# Patient Record
Sex: MALE | Race: WHITE | NOT HISPANIC OR LATINO | Employment: FULL TIME | RURAL
[De-identification: names, ages, dates, MRNs, and addresses within clinical notes are randomized per-mention and may not be internally consistent; named-entity substitution may affect disease eponyms.]

---

## 2023-03-17 ENCOUNTER — OFFICE VISIT (OUTPATIENT)
Dept: FAMILY MEDICINE CLINIC | Facility: CLINIC | Age: 23
End: 2023-03-17
Payer: COMMERCIAL

## 2023-03-17 VITALS
OXYGEN SATURATION: 96 % | TEMPERATURE: 98.1 F | WEIGHT: 241 LBS | BODY MASS INDEX: 35.7 KG/M2 | RESPIRATION RATE: 16 BRPM | DIASTOLIC BLOOD PRESSURE: 89 MMHG | SYSTOLIC BLOOD PRESSURE: 135 MMHG | HEIGHT: 69 IN | HEART RATE: 89 BPM

## 2023-03-17 DIAGNOSIS — R79.89 ABNORMAL CBC: ICD-10-CM

## 2023-03-17 DIAGNOSIS — R79.89 LOW TESTOSTERONE: ICD-10-CM

## 2023-03-17 DIAGNOSIS — Z11.59 NEED FOR HEPATITIS C SCREENING TEST: ICD-10-CM

## 2023-03-17 DIAGNOSIS — E29.1 HYPOGONADISM IN MALE: ICD-10-CM

## 2023-03-17 DIAGNOSIS — R03.0 ELEVATED BLOOD PRESSURE READING: ICD-10-CM

## 2023-03-17 DIAGNOSIS — Z00.00 WELLNESS EXAMINATION: Primary | ICD-10-CM

## 2023-03-17 DIAGNOSIS — F43.9 STRESS: ICD-10-CM

## 2023-03-17 DIAGNOSIS — E66.01 CLASS 2 SEVERE OBESITY DUE TO EXCESS CALORIES WITH SERIOUS COMORBIDITY AND BODY MASS INDEX (BMI) OF 35.0 TO 35.9 IN ADULT: ICD-10-CM

## 2023-03-17 PROBLEM — K76.0 FATTY LIVER: Status: ACTIVE | Noted: 2023-03-17

## 2023-03-17 PROBLEM — I88.0 MESENTERIC ADENITIS: Status: ACTIVE | Noted: 2023-03-17

## 2023-03-17 PROBLEM — E66.3 OVERWEIGHT: Status: RESOLVED | Noted: 2023-03-17 | Resolved: 2023-03-17

## 2023-03-17 PROBLEM — E66.812 CLASS 2 SEVERE OBESITY DUE TO EXCESS CALORIES WITH SERIOUS COMORBIDITY AND BODY MASS INDEX (BMI) OF 35.0 TO 35.9 IN ADULT: Status: ACTIVE | Noted: 2023-03-17

## 2023-03-17 PROBLEM — E66.3 OVERWEIGHT: Status: ACTIVE | Noted: 2023-03-17

## 2023-03-17 PROCEDURE — 99385 PREV VISIT NEW AGE 18-39: CPT | Performed by: NURSE PRACTITIONER

## 2023-03-17 NOTE — PROGRESS NOTES
Chief Complaint  Annual Exam and Establish Care    Subjective          Neel is a 22 y.o. male  who presents to [facility]     Patient Care Team:  Angélica Abraham APRN as PCP - General (Family Medicine)     History of Present Illness  Neel is here to establish care.  New patient.    Adult Annual Wellness    Social History    Tobacco Use      Smoking status: Never      Smokeless tobacco: Never    Vaping Use      Vaping Use: Some days        Substances: Nicotine, Flavoring        Devices: Refillable tank    Alcohol use: Yes      Comment: Social    Drug use: Never    General health habits  Last eye exam -  Over 1 year ago  Last dental exam - over 1 year ago    Diet - Regular diet    Weight / BMI - obese,  BMI 35.6    Exercise - none    Reproductive health -  Sexually active - yes  Trying to conceive    Screening/prevention  Colon cancer screening - not applicable due to age    He goes to the [facility]  He thinks he has been going for 14 months.  He made the appointment due to decreased sex drive and difficulty getting and maintaining erections.  First visit he had labwork.  He was then started on testosterone shots.  He goes once weekly for a testosterone injection (unsure dose).   Last injection 3/7/23  Last labwork was ~ 5 weeks ago.  He feels the shots helped in the beginning (more energy) but not now.    He has never fathered a child.  He has had intercourse with his partner for over 1 year without protection and she has not become pregnant.    He was told his hemoglobin was elevated on 2 different occasions and was told to donate blood.    Left eye peripheral vision (blurry - vision goes dark) for about 20 minutes. This has happened twice  Vision returned and then he had a headache.  Headache lasted about 1hour  He took excedrin migraine which helped.    Feeling anxious and depressed  Job is stressful  Inability to perform sexually is causing stress and  depression.      Neel Spear  has a past medical history of Depression, Headache, and Visual impairment.      Review of Systems   Constitutional: Positive for fatigue. Negative for fever.   HENT: Negative for ear pain and sore throat.    Eyes: Positive for visual disturbance (two times).   Respiratory: Negative for cough and shortness of breath.    Cardiovascular: Positive for palpitations. Negative for chest pain.   Gastrointestinal: Negative for abdominal pain, blood in stool, constipation, diarrhea, nausea and vomiting.   Endocrine: Positive for polydipsia. Negative for polyphagia and polyuria.   Genitourinary: Negative for difficulty urinating, frequency and urgency.   Musculoskeletal: Negative for arthralgias.   Skin: Negative for rash.   Allergic/Immunologic: Positive for environmental allergies.   Neurological: Positive for headaches. Negative for dizziness.   Psychiatric/Behavioral: Positive for dysphoric mood. Negative for suicidal ideas. The patient is nervous/anxious.         Family History   Problem Relation Age of Onset   • Depression Mother    • Bipolar disorder Mother    • Anxiety disorder Mother    • Hypothyroidism Mother    • Heart attack Mother    • Nephrolithiasis Father    • Drug abuse Father         Former   • Scoliosis Sister    • ADD / ADHD Sister    • Anxiety disorder Sister    • Depression Sister    • Neuropathy Maternal Grandmother    • Hypothyroidism Maternal Grandmother    • Dementia Maternal Grandmother    • Heart attack Maternal Grandfather         Past Surgical History:   Procedure Laterality Date   • NO PAST SURGERIES          Social History     Socioeconomic History   • Marital status:      Spouse name: Margarita Spear   • Number of children: 0   • Highest education level: High school graduate   Tobacco Use   • Smoking status: Never   • Smokeless tobacco: Never   Vaping Use   • Vaping Use: Some days   • Substances: Nicotine, Flavoring   • Devices: Refillable tank   Substance  "and Sexual Activity   • Alcohol use: Yes     Comment: Social   • Drug use: Never   • Sexual activity: Yes     Partners: Female     Birth control/protection: None     Comment:         Immunization History   Administered Date(s) Administered   • DTaP 1999, 01/04/2000, 04/03/2000, 01/14/2002, 07/06/2004   • Hepatitis B 2000, 2000, 04/03/2000, 01/14/2002   • MMR 04/03/2000, 07/06/2004   • Meningococcal B,(Bexsero) 01/04/2018   • Meningococcal Conjugate 07/25/2011, 01/04/2018   • OPV 1999, 01/04/2000, 01/14/2002, 07/06/2004   • Tdap 07/25/2011   • Varicella 01/14/2002, 07/25/2011       Objective       Current Outpatient Medications:   •  TESTOSTERONE TD, Inject  as directed 1 (One) Time Per Week., Disp: , Rfl:     Vital Signs:      /89 (BP Location: Left arm, Patient Position: Sitting, Cuff Size: Large Adult)   Pulse 89   Temp 98.1 °F (36.7 °C) (Infrared)   Resp 16   Ht 175.3 cm (69\")   Wt 109 kg (241 lb)   SpO2 96%   BMI 35.59 kg/m²     Vitals:    03/17/23 1103 03/17/23 1115   BP: 122/83 135/89   BP Location: Right arm Left arm   Patient Position: Sitting Sitting   Cuff Size: Large Adult Large Adult   Pulse: 89    Resp: 16    Temp: 98.1 °F (36.7 °C)    TempSrc: Infrared    SpO2: 96%    Weight: 109 kg (241 lb)    Height: 175.3 cm (69\")       Physical Exam  Vitals reviewed.   Constitutional:       General: He is not in acute distress.     Appearance: Normal appearance. He is obese.   HENT:      Head: Normocephalic and atraumatic.      Right Ear: Tympanic membrane, ear canal and external ear normal.      Left Ear: Tympanic membrane, ear canal and external ear normal.      Nose: Nose normal.      Mouth/Throat:      Mouth: Mucous membranes are moist.      Pharynx: Oropharynx is clear.   Eyes:      General: No scleral icterus.     Conjunctiva/sclera: Conjunctivae normal.   Neck:      Thyroid: No thyromegaly.   Cardiovascular:      Rate and Rhythm: Normal rate and regular rhythm.     "  Heart sounds: Normal heart sounds.   Pulmonary:      Effort: Pulmonary effort is normal. No respiratory distress.      Breath sounds: Normal breath sounds. No wheezing.   Abdominal:      General: Bowel sounds are normal. There is no distension.      Palpations: Abdomen is soft. There is no mass.      Tenderness: There is no abdominal tenderness. There is no guarding or rebound.   Musculoskeletal:      Cervical back: Neck supple.      Right lower leg: No edema.      Left lower leg: No edema.   Lymphadenopathy:      Cervical: No cervical adenopathy.   Skin:     General: Skin is warm and dry.   Neurological:      Mental Status: He is alert and oriented to person, place, and time.   Psychiatric:         Mood and Affect: Mood normal.        Result Review :                Reviewed labs done on 12/22/22, 7/15/22, 5/27/22 and 2/23/22 at [facility]      PHQ-9 Depression Screening  Little interest or pleasure in doing things? 2-->more than half the days   Feeling down, depressed, or hopeless? 2-->more than half the days   Trouble falling or staying asleep, or sleeping too much? 1-->several days   Feeling tired or having little energy? 1-->several days   Poor appetite or overeating? 1-->several days   Feeling bad about yourself - or that you are a failure or have let yourself or your family down? 2-->more than half the days   Trouble concentrating on things, such as reading the newspaper or watching television? 0-->not at all   Moving or speaking so slowly that other people could have noticed? Or the opposite - being so fidgety or restless that you have been moving around a lot more than usual? 0-->not at all   Thoughts that you would be better off dead, or of hurting yourself in some way? 0-->not at all   PHQ-9 Total Score 9   If you checked off any problems, how difficult have these problems made it for you to do your work, take care of things at home, or get along with other people? very difficult         Over the last two  weeks, how often have you been bothered by the following problems?  Feeling nervous, anxious or on edge: Several days  Not being able to stop or control worrying: More than half the days  Worrying too much about different things: More than half the days  Trouble Relaxing: More than half the days  Being so restless that it is hard to sit still: Not at all  Becoming easily annoyed or irritable: More than half the days  Feeling afraid as if something awful might happen: Several days  PING 7 Total Score: 10  If you checked any problems, how difficult have these problems made it for you to do your work, take care of things at home, or get along with other people: Somewhat difficult           Assessment and Plan    Diagnoses and all orders for this visit:    1. Wellness examination (Primary)  Assessment & Plan:  Discussed preventative healthcare.  Labs ordered. Recommended annual eye and dental exams.    Orders:  -     CBC & Differential  -     Comprehensive Metabolic Panel  -     Lipid Panel  -     TSH Rfx On Abnormal To Free T4    2. Hypogonadism in male  Assessment & Plan:  Testosterone level  166 (low) on 2/23/22  338 (normal) on 5/27/22  349 (normal) on 7/15/22  600 (normal) on 12/22/22    Prolactin low (3.7) on 2/23/22    Ordered Pituitary focused MRI brain with/without contrast.   Refer to endocrinology.    Orders:  -     MRI Brain With & Without Contrast; Future  -     Ambulatory Referral to Endocrinology    3. Low testosterone  -     Testosterone (Free & Total), LC / MS  -     PSA DIAGNOSTIC    4. Abnormal CBC  Comments:  Hgb/Hct high on 12/22/22, 5/27/22    5. Elevated blood pressure reading    6. Stress  Comments:  Discussed PHQ-9 and PING-7 results.  He feels his inability to sexually perform is causing his symptoms.  Will monitor.    7. Need for hepatitis C screening test  -     Hepatitis C Antibody    8. Class 2 severe obesity due to excess calories with serious comorbidity and body mass index (BMI) of 35.0 to  35.9 in adult ([facility])  Assessment & Plan:  Patient's (Body mass index is 35.59 kg/m².) indicates that they are obese (BMI >30) with health conditions that include none . Weight is newly identified. BMI  is above average; BMI management plan is completed. We discussed portion control and increasing exercise.        Will request records from [facility].  Advised to make an appointment for an eye exam due to recent episodes of visual disturbance.  Ordered labs and MRI of brain w/wo to assess pituitary gland.  Refer to endocrinology. Monitor blood pressure at home and bring readings to follow up appointment. Will monitor depression/anxiety symptoms.      Follow Up   Return in about 4 weeks (around 4/14/2023) for follow up depression and elevated blood pressure.  Patient was given instructions and counseling regarding his condition or for health maintenance advice. Please see specific information pulled into the AVS if appropriate.    There are no Patient Instructions on file for this visit.

## 2023-03-20 NOTE — ASSESSMENT & PLAN NOTE
Patient's (Body mass index is 35.59 kg/m².) indicates that they are obese (BMI >30) with health conditions that include none . Weight is newly identified. BMI  is above average; BMI management plan is completed. We discussed portion control and increasing exercise.

## 2023-03-20 NOTE — ASSESSMENT & PLAN NOTE
Testosterone level  166 (low) on 2/23/22  338 (normal) on 5/27/22  349 (normal) on 7/15/22  600 (normal) on 12/22/22    Prolactin low (3.7) on 2/23/22    Ordered Pituitary focused MRI brain with/without contrast.   Refer to endocrinology.

## 2023-03-24 ENCOUNTER — TELEPHONE (OUTPATIENT)
Dept: FAMILY MEDICINE CLINIC | Facility: CLINIC | Age: 23
End: 2023-03-24
Payer: COMMERCIAL

## 2023-03-24 DIAGNOSIS — H53.9 VISUAL CHANGES: Primary | ICD-10-CM

## 2023-03-24 DIAGNOSIS — R90.89 ABNORMAL BRAIN MRI: ICD-10-CM

## 2023-03-24 LAB
ALBUMIN SERPL-MCNC: 4.9 G/DL (ref 4.1–5.2)
ALBUMIN/GLOB SERPL: 2 {RATIO} (ref 1.2–2.2)
ALP SERPL-CCNC: 82 IU/L (ref 44–121)
ALT SERPL-CCNC: 61 IU/L (ref 0–44)
AST SERPL-CCNC: 30 IU/L (ref 0–40)
BASOPHILS # BLD AUTO: 0.1 X10E3/UL (ref 0–0.2)
BASOPHILS NFR BLD AUTO: 1 %
BILIRUB SERPL-MCNC: 1.8 MG/DL (ref 0–1.2)
BUN SERPL-MCNC: 15 MG/DL (ref 6–20)
BUN/CREAT SERPL: 12 (ref 9–20)
CALCIUM SERPL-MCNC: 9.9 MG/DL (ref 8.7–10.2)
CHLORIDE SERPL-SCNC: 99 MMOL/L (ref 96–106)
CHOLEST SERPL-MCNC: 210 MG/DL (ref 100–199)
CO2 SERPL-SCNC: 24 MMOL/L (ref 20–29)
CREAT SERPL-MCNC: 1.29 MG/DL (ref 0.76–1.27)
EGFRCR SERPLBLD CKD-EPI 2021: 80 ML/MIN/1.73
EOSINOPHIL # BLD AUTO: 0.1 X10E3/UL (ref 0–0.4)
EOSINOPHIL NFR BLD AUTO: 2 %
ERYTHROCYTE [DISTWIDTH] IN BLOOD BY AUTOMATED COUNT: 13.3 % (ref 11.6–15.4)
GLOBULIN SER CALC-MCNC: 2.5 G/DL (ref 1.5–4.5)
GLUCOSE SERPL-MCNC: 99 MG/DL (ref 70–99)
HCT VFR BLD AUTO: 58.8 % (ref 37.5–51)
HCV IGG SERPL QL IA: NON REACTIVE
HDLC SERPL-MCNC: 30 MG/DL
HGB BLD-MCNC: 19.9 G/DL (ref 13–17.7)
IMM GRANULOCYTES # BLD AUTO: 0.1 X10E3/UL (ref 0–0.1)
IMM GRANULOCYTES NFR BLD AUTO: 1 %
LDLC SERPL CALC-MCNC: 154 MG/DL (ref 0–99)
LYMPHOCYTES # BLD AUTO: 2.1 X10E3/UL (ref 0.7–3.1)
LYMPHOCYTES NFR BLD AUTO: 25 %
MCH RBC QN AUTO: 28.2 PG (ref 26.6–33)
MCHC RBC AUTO-ENTMCNC: 33.8 G/DL (ref 31.5–35.7)
MCV RBC AUTO: 83 FL (ref 79–97)
MONOCYTES # BLD AUTO: 0.7 X10E3/UL (ref 0.1–0.9)
MONOCYTES NFR BLD AUTO: 8 %
NEUTROPHILS # BLD AUTO: 5.5 X10E3/UL (ref 1.4–7)
NEUTROPHILS NFR BLD AUTO: 63 %
PLATELET # BLD AUTO: 298 X10E3/UL (ref 150–450)
POTASSIUM SERPL-SCNC: 4.5 MMOL/L (ref 3.5–5.2)
PROT SERPL-MCNC: 7.4 G/DL (ref 6–8.5)
RBC # BLD AUTO: 7.06 X10E6/UL (ref 4.14–5.8)
SODIUM SERPL-SCNC: 141 MMOL/L (ref 134–144)
TESTOST FREE SERPL-MCNC: 17.5 PG/ML (ref 9.3–26.5)
TESTOST SERPL-MCNC: 327 NG/DL (ref 264–916)
TRIGL SERPL-MCNC: 142 MG/DL (ref 0–149)
TSH SERPL DL<=0.005 MIU/L-ACNC: 1.93 UIU/ML (ref 0.45–4.5)
VLDLC SERPL CALC-MCNC: 26 MG/DL (ref 5–40)
WBC # BLD AUTO: 8.5 X10E3/UL (ref 3.4–10.8)

## 2023-04-06 ENCOUNTER — TELEPHONE (OUTPATIENT)
Dept: FAMILY MEDICINE CLINIC | Facility: CLINIC | Age: 23
End: 2023-04-06
Payer: COMMERCIAL

## 2023-04-06 NOTE — TELEPHONE ENCOUNTER
Patient called in to see if there was anything that could be done to assist him in getting refunded for the T-shot that he did not get. They are needing a letter that says his provider does not want him taking the shots. There was already a letter faxed stating that he was advised to stop the shot by the provider but they are needing more than that. Advise? Do you recommended anyting

## 2023-08-17 ENCOUNTER — OFFICE VISIT (OUTPATIENT)
Dept: SLEEP MEDICINE | Facility: CLINIC | Age: 23
End: 2023-08-17
Payer: COMMERCIAL

## 2023-08-17 VITALS
BODY MASS INDEX: 34.84 KG/M2 | DIASTOLIC BLOOD PRESSURE: 100 MMHG | WEIGHT: 243.4 LBS | HEIGHT: 70 IN | SYSTOLIC BLOOD PRESSURE: 139 MMHG | HEART RATE: 80 BPM | OXYGEN SATURATION: 96 %

## 2023-08-17 DIAGNOSIS — G47.8 NON-RESTORATIVE SLEEP: ICD-10-CM

## 2023-08-17 DIAGNOSIS — R06.83 SNORING: ICD-10-CM

## 2023-08-17 DIAGNOSIS — E66.09 CLASS 1 OBESITY DUE TO EXCESS CALORIES WITH BODY MASS INDEX (BMI) OF 34.0 TO 34.9 IN ADULT, UNSPECIFIED WHETHER SERIOUS COMORBIDITY PRESENT: ICD-10-CM

## 2023-08-17 DIAGNOSIS — R29.818 SUSPECTED SLEEP APNEA: Primary | ICD-10-CM

## 2023-08-17 PROCEDURE — G0463 HOSPITAL OUTPT CLINIC VISIT: HCPCS

## 2023-08-17 RX ORDER — ASPIRIN 81 MG/1
81 TABLET ORAL DAILY
COMMUNITY

## 2023-08-17 NOTE — PROGRESS NOTES
[facility]  [address] [place] [id number]  Phone   Fax       Neel Spear  [phone]   2000  23 y.o.  male      Referring Provider and PCP: Angélica Abraham APRN    Type of service: Initial Sleep Medicine Consult.  Date of service: 8/17/2023          CHIEF COMPLAINT: Suspected sleep apnea      HISTORY OF PRESENT ILLNESS:  Thank you for asking us to see Neel Spear.  Neel Spear 23 y.o. was seen today on 8/17/2023 at [facility].  Patient presents today with symptoms of snoring, non-restorative sleep, and suspected sleep apnea.  The symptoms are chronic and persistent in nature.  Patient has no history of tonsillectomy, adenoidectomy, nasal surgery, UPPP.   He does work rotating shift with an odd schedule.  Regardless of his shift he can usually fall asleep within 30 minutes and usually does get 6 to 8 hours of sleep per day.  Does wake up tired.  No issues staying awake at work.  He has been undergoing evaluation for low testosterone and it was recommended that sleep apnea be ruled out.  He does snore loudly and has a history of waking up gasping for breath and sleep not restorative.      SLEEP HISTORY:  Sleep schedule:  Bedtime: 5 to 7 AM  Wake time: 12 to 2 PM  Normally takes 30 minutes to fall asleep  Average hours of sleep: 6 to 8 hours  Number of naps per day: 0    Symptoms:   In addition to the above, patient reports the following associated symptoms:  Have you ever awakened gasping for breath, coughing, choking: Yes   Change in weight:  No   Morning headaches:  No   Awaken with a sore throat or dry mouth:  Yes   Leg jerking at night:  Yes   Creepy crawly feeling in legs/urge to move legs: No   Teeth grinding: No   Have you ever awakened at night with a sour taste or burning sensation in your chest:  No   Do you have muscle weakness with laughing or anger:  No   Have you ever felt paralyzed while going to sleep or waking up:  No  "  Sleepwalking: No   Nightmares: No   Nocturia (urination at night): 0 times per night  Memory Problem: No     Medical Conditions (PMH):   Low testosterone levels    Social history:  Shift work:  Yes   Tobacco use:  No   Alcohol use: 1 per week  Caffeinated drinks: 2-3 per day  Occupation:     Family History (parents and siblings) (pertaining to sleep medicine):  Heart disease  Hypertension    Medications: reviewed    Allergies:  Patient has no known allergies.      REVIEW OF SYSTEMS:  Pertinent positive symptoms are:  Snoring  [place] Sleepiness Scale of Total score: 5   Fatigue  Nasal congestion  Heartburn  Anxiety and depression        PHYSICAL EXAM:  CONSTITUTINONAL:   Vitals:    08/17/23 0900   BP: 139/100   BP Location: Left arm   Patient Position: Sitting   Pulse: 80   SpO2: 96%   Weight: 110 kg (243 lb 6.4 oz)   Height: 177.8 cm (70\")    Body mass index is 34.92 kg/mý.   HEAD: atraumatic, normocephalic   THROAT: tonsils are moderately enlarged, Mallampati class III-IV  NECK: Neck Circumference: 16 inches, trachea is midline  RESPIRATORY SYSTEM: Respirations even, unlabored, normal rate  CARDIOVASULAR SYSTEM: Normal rate, no edema  NEUROLOGICAL SYSTEM: Alert and oriented x 3, normal gait  PSYCHIATRIC SYSTEM: Mood is normal/ appropriate     Office note(s) from care team reviewed. Office note(s) dated 3/17/2023, reviewed.    Labs/ Test Results Reviewed:  TSH          3/18/2023    10:05   TSH   TSH 1.930                  ASSESSMENT AND PLAN:   Suspected sleep apnea: patient's symptoms and physical examination are concerning for possible sleep apnea (G47.30).   I discussed the signs, symptoms, and pathophysiology of sleep apnea with this patient.  I also discussed the potential complications of untreated sleep apnea including but not limited to resistant hypertension, insulin resistance, pulmonary hypertension, atrial fibrillation, stroke, nonrestorative sleep with hypersomnia which can increase risk " for motor vehicle accidents, etc.   Different testing methods including home-based and lab based sleep studies were discussed with this patient.   Based on patient history and physical examination, the most appropriate study is home sleep study.  The order for the sleep study is placed in [facility].  The test will be scheduled after prior authorization has been obtained through patient's insurance.  Treatment and management will be discussed in more detail with this patient after the test is completed.  All questions were answered to patient's satisfaction.   Snoring (R06.83): snoring is the sound created by turbulent airflow vibrating upper airway soft tissue.  I have also discussed factors affecting snoring including sleep deprivation, sleeping on the back and alcohol ingestion. To minimize snoring, patient is advised to have adequate sleep, sleep on their side, and avoid alcohol and sedative medications around bedtime.   Excessive daytime sleepiness: [place] Sleepiness Scale of Total score: 5.  There are many causes of excessive daytime sleepiness including but not limited to sleep apnea, shiftwork syndrome, depression, and other medical disorders such as heart disease, kidney disease, and liver failure.  The most serious cause of excessive sleepiness is due to neurological conditions such as narcolepsy/cataplexy.  More commonly excessive sleepiness is caused by sleep apnea with frequent awakenings during sleep time.  I have discussed safety of driving and to remain vigilant while driving.  Obesity: Body mass index is 34.92 kg/mý.. Patients who are overweight or obese are at increased risk of sleep apnea/ sleep disordered breathing. Weight reduction and healthy lifestyle are encouraged in overweight/ obese patients as part of a comprehensive approach to sleep apnea treatment.   Tonsillar enlargement:  Asymptomatic. Discussed possible ENT referral.  Patient declines at this time.   Shiftwork: prioritize sleep, nap as  needed when changing between shifts.  No issues staying awake at work at this time.  Anxiety and depression: Following with PCP on this  Low testosterone: Following with endocrinology    I have also discussed with the patient the following  Sleep hygiene: try to maintain a regular bed time and wake time when possible, avoid watching TV/ using electronic devices in bed (including cell phones), limit caffeinated and alcoholic beverages before bed, try to maintain a cool and quiet sleep environment, avoid daytime naps  Adequate amount of sleep: most people need around 7 to 8 hours of sleep each night        Patient will follow-up after study, 31 to 90 days after PAP therapy initiated if applicable, or contact the office sooner for questions or concerns. Patient's questions were answered.            Thank you again for asking me to consult on this patient.  Please do not hesitate to call me if you have additional questions or concerns.       Isis Cárdenas DNP, APRN  [facility]

## 2023-09-08 NOTE — PROGRESS NOTES
Subjective: Migraine x2    Patient ID: Neel Spear is a 23 y.o. male.    CHIEF COMPLAINT: Migraine x2    History of Present Illness Mr. Spear is a very pleasant 23-year-old  male with a BMI of 34.44 who presented alone as a  New patient referred by Angélica RUIZ for migraine with visual changes and abnormal MRI.  The patient states that his mother and his sister have a history of migraine.  He states he has never had a migraine until he received a dose of testosterone.  He states within the 24 hours after that he had blurry vision in his left eye with some tunnellike vision for about 20 minutes then a severe migraine headache.  The patient states he got a second dose of testosterone and had a similar occurrence.  He reports at this time he is no longer taking any testosterone and has not had any other headaches.  The patient was notified that hormone fluctuations can trigger migraine.  The patient states he is on a different medication that has not caused any headaches or migraines and that he is doing very well.  He states he has had no other visual changes.  He reports that he took an Excedrin Migraine and it decreased the severity of both migraines.    The patient also had an MRI of the brain which was read as normal but did show some microvascular changes in bilateral frontal areas.  The patient states he does smoke and is trying to quit.  He was notified that smoking is the most common cause of microvascular changes in the brain along with migraine, hypertension and diabetes.  He was notified that he did not have a severe amount of white matter changes but he was encouraged to continue with smoking cessation.  X-ray of orbits was also within normal limits.          New onset migraine x2  Complaint: Migraines/ vision change  Onset: 1 year ago x2 occurrences  Aura: loss of peripheral vision  Location:  front of heat  Quality: stabbing  Severity: moderate  Duration:  1 hour  Frequency: has only  happened twice  Timing:n/a  Context:n/a  Modifying factors: medication made better  Associated Signs and symptoms:  n/a  Current meds:Excedrin      XRAY ORBITS FB 2 VIEWS  3-  Indication: metal in eye  Impression:  No evidence of radiopaque foreign body in the orbits.   Electronically signed: Jose Ford          MRI BRAIN AND PITUITARY WITH AND WITHOUT CONTRAST  3-  Impression:  1. No significant abnormality is identified within the brain.  2. The pituitary gland appears unremarkable.  3. No abnormal contrast enhancement is identified.    The following portions of the patient's history were reviewed and updated as appropriate: allergies, current medications, past family history, past medical history, past social history, past surgical history and problem list.      Family History   Problem Relation Age of Onset    Depression Mother     Bipolar disorder Mother     Anxiety disorder Mother     Hypothyroidism Mother     Heart attack Mother     Nephrolithiasis Father     Drug abuse Father         Former    Scoliosis Sister     ADD / ADHD Sister     Anxiety disorder Sister     Depression Sister     Neuropathy Maternal Grandmother     Hypothyroidism Maternal Grandmother     Dementia Maternal Grandmother     Heart attack Maternal Grandfather     Sleep apnea Neg Hx     Sleep disorder Neg Hx     Narcolepsy Neg Hx        Past Medical History:   Diagnosis Date    Depression     Headache     Visual impairment        Social History     Socioeconomic History    Marital status:      Spouse name: Margarita Spear    Number of children: 0    Highest education level: High school graduate   Tobacco Use    Smoking status: Never     Passive exposure: Never    Smokeless tobacco: Never   Vaping Use    Vaping Use: Some days   Substance and Sexual Activity    Alcohol use: Yes     Comment: Social    Drug use: Never    Sexual activity: Yes     Partners: Female     Birth control/protection: None     Comment:           Current Outpatient Medications:     aspirin 81 MG EC tablet, Take 1 tablet by mouth Daily., Disp: , Rfl:     Clomid 50 MG tablet, Take 1 tablet by mouth Daily., Disp: , Rfl:     tadalafil (CIALIS) 10 MG tablet, Take 1 tablet by mouth Daily As Needed., Disp: , Rfl:     Review of Systems   Neurological:  Positive for headaches.   All other systems reviewed and are negative.     I have reviewed ROS completed by medical assistant.     Objective:    Neurologic Exam     Mental Status   Oriented to person, place, and time.   Speech: speech is normal     Cranial Nerves   Cranial nerves II through XII intact.     CN III, IV, VI   Pupils are equal, round, and reactive to light.    Gait, Coordination, and Reflexes     Gait  Gait: normal    Coordination   Finger to nose coordination: normal  Tandem walking coordination: normal    Reflexes   Right brachioradialis: 2+  Left brachioradialis: 2+  Right biceps: 2+  Left biceps: 2+  Right triceps: 2+  Left triceps: 2+  Right patellar: 2+  Left patellar: 2+  Right achilles: 2+  Left achilles: 2+    Physical Exam  Vitals reviewed.   Constitutional:       Appearance: Normal appearance. He is well-groomed and normal weight.   HENT:      Head: Normocephalic and atraumatic.      Right Ear: Hearing normal.      Left Ear: Hearing normal.      Nose: Nose normal.      Mouth/Throat:      Lips: Pink.      Mouth: Mucous membranes are moist.   Eyes:      General: Lids are normal. No visual field deficit.     Extraocular Movements: Extraocular movements intact.      Right eye: Normal extraocular motion and no nystagmus.      Left eye: Normal extraocular motion and no nystagmus.      Pupils: Pupils are equal, round, and reactive to light.   Cardiovascular:      Rate and Rhythm: Normal rate and regular rhythm.      Pulses: Normal pulses.      Heart sounds: Normal heart sounds, S1 normal and S2 normal.   Pulmonary:      Effort: Pulmonary effort is normal.      Breath sounds: Normal breath  sounds and air entry.   Musculoskeletal:         General: Normal range of motion.      Cervical back: Normal range of motion.      Comments: 5/5 in all extremities including bilateral , bilateral dorsiflexion and plantar flexion of feet and no Clonus.    Skin:     General: Skin is warm and dry.   Neurological:      Mental Status: He is alert and oriented to person, place, and time.      Cranial Nerves: Cranial nerves 2-12 are intact. No cranial nerve deficit, dysarthria or facial asymmetry.      Sensory: Sensation is intact. No sensory deficit.      Motor: Motor function is intact. No weakness, tremor, atrophy, abnormal muscle tone, seizure activity or pronator drift.      Coordination: Coordination is intact. Romberg sign negative. Coordination normal. Finger-Nose-Finger Test and Heel to Shin Test normal. Rapid alternating movements normal.      Gait: Gait is intact. Gait and tandem walk normal.      Deep Tendon Reflexes: Babinski sign absent on the right side. Babinski sign absent on the left side.      Reflex Scores:       Tricep reflexes are 2+ on the right side and 2+ on the left side.       Bicep reflexes are 2+ on the right side and 2+ on the left side.       Brachioradialis reflexes are 2+ on the right side and 2+ on the left side.       Patellar reflexes are 2+ on the right side and 2+ on the left side.       Achilles reflexes are 2+ on the right side and 2+ on the left side.  Psychiatric:         Attention and Perception: Attention and perception normal.         Mood and Affect: Mood normal.         Speech: Speech normal.         Behavior: Behavior is cooperative.     Assessment/Plan:  Discussion: Migraine can be caused by hormone increase such as estrogen or testosterone.  As the patient is on a different medication which is causing him to produce a little more testosterone and not directly taking testosterone he has had no further migraines.  He does have a strong family history in his mother and his  sister.  At this time the patient will be referred back to his primary care physician and can come back to neurology as a follow-up during the next 3 years should his migraines recur.  The patient has currently gone 1 year with no migraine after discontinuing testosterone.  The patient was educated on triggers for migraine and some medications that could potentially help if they recur.    Diagnoses and all orders for this visit:    1. H/O migraine (Primary)        Return if symptoms worsen or fail to improve.    I spent 35 minutes caring for Neel on this date of service. This time includes time spent by me in the following activities: reviewing tests, obtaining and/or reviewing a separately obtained history, performing a medically appropriate examination and/or evaluation, counseling and educating the patient/family/caregiver, documenting information in the medical record, and independently interpreting results and communicating that information with the patient/family/caregiver.      This document has been electronically signed by Demetria ROTHMAN on September 12, 2023 08:40 EDT

## 2023-09-12 ENCOUNTER — OFFICE VISIT (OUTPATIENT)
Dept: NEUROLOGY | Facility: CLINIC | Age: 23
End: 2023-09-12
Payer: COMMERCIAL

## 2023-09-12 VITALS
HEART RATE: 80 BPM | HEIGHT: 70 IN | DIASTOLIC BLOOD PRESSURE: 91 MMHG | WEIGHT: 240 LBS | BODY MASS INDEX: 34.36 KG/M2 | SYSTOLIC BLOOD PRESSURE: 154 MMHG

## 2023-09-12 DIAGNOSIS — Z86.69 H/O MIGRAINE: Primary | ICD-10-CM

## 2023-09-15 ENCOUNTER — PATIENT ROUNDING (BHMG ONLY) (OUTPATIENT)
Dept: NEUROLOGY | Facility: CLINIC | Age: 23
End: 2023-09-15
Payer: COMMERCIAL

## 2023-09-28 ENCOUNTER — HOSPITAL ENCOUNTER (OUTPATIENT)
Dept: SLEEP MEDICINE | Facility: HOSPITAL | Age: 23
Discharge: HOME | End: 2023-09-28
Admitting: NURSE PRACTITIONER
Payer: COMMERCIAL

## 2023-09-28 DIAGNOSIS — E66.09 CLASS 1 OBESITY DUE TO EXCESS CALORIES WITH BODY MASS INDEX (BMI) OF 34.0 TO 34.9 IN ADULT, UNSPECIFIED WHETHER SERIOUS COMORBIDITY PRESENT: ICD-10-CM

## 2023-09-28 DIAGNOSIS — G47.8 NON-RESTORATIVE SLEEP: ICD-10-CM

## 2023-09-28 DIAGNOSIS — R06.83 SNORING: ICD-10-CM

## 2023-09-28 DIAGNOSIS — R29.818 SUSPECTED SLEEP APNEA: ICD-10-CM

## 2023-09-28 PROCEDURE — 95806 SLEEP STUDY UNATT&RESP EFFT: CPT

## 2023-10-03 DIAGNOSIS — G47.33 OSA (OBSTRUCTIVE SLEEP APNEA): Primary | ICD-10-CM

## 2024-10-25 ENCOUNTER — OFFICE VISIT (OUTPATIENT)
Dept: FAMILY MEDICINE CLINIC | Facility: CLINIC | Age: 24
End: 2024-10-25
Payer: COMMERCIAL

## 2024-10-25 VITALS
RESPIRATION RATE: 18 BRPM | HEART RATE: 115 BPM | BODY MASS INDEX: 35.3 KG/M2 | TEMPERATURE: 97.3 F | DIASTOLIC BLOOD PRESSURE: 78 MMHG | WEIGHT: 246.6 LBS | SYSTOLIC BLOOD PRESSURE: 130 MMHG | OXYGEN SATURATION: 98 % | HEIGHT: 70 IN

## 2024-10-25 DIAGNOSIS — E66.01 CLASS 2 SEVERE OBESITY DUE TO EXCESS CALORIES WITH SERIOUS COMORBIDITY AND BODY MASS INDEX (BMI) OF 35.0 TO 35.9 IN ADULT: ICD-10-CM

## 2024-10-25 DIAGNOSIS — F32.A DEPRESSION, UNSPECIFIED DEPRESSION TYPE: ICD-10-CM

## 2024-10-25 DIAGNOSIS — E66.812 CLASS 2 SEVERE OBESITY DUE TO EXCESS CALORIES WITH SERIOUS COMORBIDITY AND BODY MASS INDEX (BMI) OF 35.0 TO 35.9 IN ADULT: ICD-10-CM

## 2024-10-25 DIAGNOSIS — Z28.21 INFLUENZA VACCINATION DECLINED: ICD-10-CM

## 2024-10-25 DIAGNOSIS — Z28.21 TETANUS, DIPHTHERIA, AND ACELLULAR PERTUSSIS (TDAP) VACCINATION DECLINED: ICD-10-CM

## 2024-10-25 DIAGNOSIS — E29.1 HYPOGONADISM, MALE: ICD-10-CM

## 2024-10-25 DIAGNOSIS — Z00.00 WELLNESS EXAMINATION: Primary | ICD-10-CM

## 2024-10-25 DIAGNOSIS — G47.33 OSA (OBSTRUCTIVE SLEEP APNEA): ICD-10-CM

## 2024-10-25 DIAGNOSIS — N52.9 ERECTILE DYSFUNCTION, UNSPECIFIED ERECTILE DYSFUNCTION TYPE: ICD-10-CM

## 2024-10-25 PROCEDURE — 99395 PREV VISIT EST AGE 18-39: CPT | Performed by: NURSE PRACTITIONER

## 2024-10-25 PROCEDURE — 99214 OFFICE O/P EST MOD 30 MIN: CPT | Performed by: NURSE PRACTITIONER

## 2024-10-25 RX ORDER — BUPROPION HYDROCHLORIDE 150 MG/1
150 TABLET ORAL EVERY MORNING
Qty: 30 TABLET | Refills: 3 | Status: SHIPPED | OUTPATIENT
Start: 2024-10-25

## 2024-10-25 RX ORDER — TADALAFIL 10 MG/1
10 TABLET ORAL DAILY PRN
Qty: 10 TABLET | Refills: 0 | Status: SHIPPED | OUTPATIENT
Start: 2024-10-25

## 2024-10-25 NOTE — PROGRESS NOTES
Chief Complaint  Annual Exam    Irene Shaikh is a 24 y.o. male  who presents to [facility]     Patient Care Team:  Angélica Abraham APRN as PCP - General (Family Medicine)  Rosita Ochoa MD as Consulting Physician (Endocrinology)     History of Present Illness  Adult Annual Wellness    Social History    Tobacco Use      Smoking status: Never        Passive exposure: Never      Smokeless tobacco: Never    Vaping Use      Vaping status: Former    Alcohol use: Yes      Comment: Social    Drug use: Never       General health habits  Last eye exam - unsure   Last dental exam - unsure    Diet - Regular diet    Weight / BMI - obese, BMI 35.4    Exercise - no    Hours of sleep per night ? 8    Safety -   Do you use seat belts consistently ? Yes   Working smoke detector in home ?  Yes   Do you use sunscreen when outdoors ? Yes     Reproductive health -  Sexually active ? Yes   Erectile dysfunction ? Yes     Screening/prevention  Colon cancer screening - not applicable due to age  Prostate cancer screening (PSA) - not applicable due to age  CT low dose lung cancer screening - not applicable due to age    Immunizations -   Tdap - 7/25/2011  Pneumococcal vaccine - no  Shingles (Shingrix) - not applicable due to age      ++++++++++++++++++++++++  Feeling down, anxious  Stress  Looking for a job  Trying to conceive a child  Semen count low at a fertility clinic  He was referred to another provider (for sperm motility)     PHQ-9 = 14  PING-7 = 16    ++++++++++++++++++++++++    Patient presents for follow-up of hypogonadism and ED  This is an established problem  Interim treatment changes: He was taking clomid and cialis.    He stopped taking both medications.  Current medications: none  He is scheduled to see Dr. Ochoa on 1/10/2025  Unsure the date he last saw Dr. Ochoa.      Neel Spear  has a past medical history of Depression, ED (erectile dysfunction), Headache, Hypogonadism,  male, JENNY (obstructive sleep apnea), and Visual impairment.      Review of Systems   Constitutional:  Positive for fatigue.   HENT:  Negative for ear pain, sore throat, trouble swallowing and voice change.    Eyes:  Negative for visual disturbance.   Gastrointestinal:  Negative for abdominal pain, blood in stool, constipation, diarrhea, nausea and vomiting.        + heartburn rarely   Genitourinary:  Negative for difficulty urinating, dysuria, hematuria, scrotal swelling, testicular pain and urgency.   Skin:  Negative for rash.   Neurological:  Negative for headaches.   Psychiatric/Behavioral:  Positive for dysphoric mood. Negative for sleep disturbance and suicidal ideas. The patient is nervous/anxious.         Family History   Problem Relation Age of Onset    Depression Mother     Bipolar disorder Mother     Anxiety disorder Mother     Hypothyroidism Mother     Heart attack Mother     Nephrolithiasis Father     Drug abuse Father         Former    Scoliosis Sister     ADD / ADHD Sister     Anxiety disorder Sister     Depression Sister     Depression Sister     Neuropathy Maternal Grandmother     Hypothyroidism Maternal Grandmother     Dementia Maternal Grandmother     Heart attack Maternal Grandfather     Sleep apnea Neg Hx     Sleep disorder Neg Hx     Narcolepsy Neg Hx         Past Surgical History:   Procedure Laterality Date    NO PAST SURGERIES          Social History     Socioeconomic History    Marital status:      Spouse name: Margarita Spear    Number of children: 0    Highest education level: High school graduate   Tobacco Use    Smoking status: Never     Passive exposure: Never    Smokeless tobacco: Never   Vaping Use    Vaping status: Former   Substance and Sexual Activity    Alcohol use: Yes     Comment: Social    Drug use: Never    Sexual activity: Yes     Partners: Female     Birth control/protection: None     Comment:         Immunization History   Administered Date(s) Administered     "DTaP 1999, 01/04/2000, 04/03/2000, 01/14/2002, 07/06/2004    Hepatitis B Adult/Adolescent IM 1999, 1999, 04/03/2000, 01/14/2002    MMR 04/03/2000, 07/06/2004    Meningococcal B,(Bexsero) 01/04/2018    Meningococcal Conjugate 07/25/2011, 01/04/2018    OPV 1999, 01/04/2000, 01/14/2002, 07/06/2004    Tdap 07/25/2011    Varicella 01/14/2002, 07/25/2011       Objective       Current Outpatient Medications:     aspirin 81 MG EC tablet, Take 1 tablet by mouth Daily., Disp: , Rfl:     tadalafil (CIALIS) 10 MG tablet, Take 1 tablet by mouth Daily As Needed for Erectile Dysfunction (take before sexual activity)., Disp: 10 tablet, Rfl: 0    buPROPion XL (WELLBUTRIN XL) 150 MG 24 hr tablet, Take 1 tablet by mouth Every Morning., Disp: 30 tablet, Rfl: 3    Vital Signs:      /78   Pulse 115   Temp 97.3 °F (36.3 °C) (Temporal)   Resp 18   Ht 177.8 cm (70\")   Wt 112 kg (246 lb 9.6 oz)   SpO2 98%   BMI 35.38 kg/m²     Vitals:    10/25/24 1400   BP: 130/78   Pulse: 115   Resp: 18   Temp: 97.3 °F (36.3 °C)   TempSrc: Temporal   SpO2: 98%   Weight: 112 kg (246 lb 9.6 oz)   Height: 177.8 cm (70\")      Physical Exam  Vitals reviewed.   Constitutional:       General: He is not in acute distress.     Appearance: Normal appearance. He is obese.   HENT:      Head: Normocephalic and atraumatic.      Right Ear: Tympanic membrane, ear canal and external ear normal.      Left Ear: Tympanic membrane, ear canal and external ear normal.      Mouth/Throat:      Mouth: Mucous membranes are moist.      Pharynx: Oropharynx is clear.   Eyes:      General: No scleral icterus.     Conjunctiva/sclera: Conjunctivae normal.   Neck:      Thyroid: No thyromegaly.   Cardiovascular:      Rate and Rhythm: Normal rate and regular rhythm.      Heart sounds: Normal heart sounds.   Pulmonary:      Effort: Pulmonary effort is normal. No respiratory distress.      Breath sounds: Normal breath sounds. No wheezing.   Abdominal:      " General: Bowel sounds are normal.      Palpations: Abdomen is soft. There is no mass.      Tenderness: There is no abdominal tenderness. There is no guarding or rebound.   Musculoskeletal:      Cervical back: Neck supple.      Right lower leg: No edema.      Left lower leg: No edema.   Lymphadenopathy:      Cervical: No cervical adenopathy.   Skin:     General: Skin is warm and dry.   Neurological:      Mental Status: He is alert and oriented to person, place, and time.   Psychiatric:         Mood and Affect: Mood normal.         Behavior: Behavior normal.          PHQ-9 Depression Screening  Little interest or pleasure in doing things? Over half   Feeling down, depressed, or hopeless? Over half   PHQ-2 Total Score 4   Trouble falling or staying asleep, or sleeping too much? Over half   Feeling tired or having little energy? Over half   Poor appetite or overeating? Several days   Feeling bad about yourself - or that you are a failure or have let yourself or your family down? Almost all   Trouble concentrating on things, such as reading the newspaper or watching television? Over half   Moving or speaking so slowly that other people could have noticed? Or the opposite - being so fidgety or restless that you have been moving around a lot more than usual? Not at all   Thoughts that you would be better off dead, or of hurting yourself in some way? Not at all   PHQ-9 Total Score 14   If you checked off any problems, how difficult have these problems made it for you to do your work, take care of things at home, or get along with other people? Somewhat difficult         Over the last two weeks, how often have you been bothered by the following problems?  Feeling nervous, anxious or on edge: Nearly every day  Not being able to stop or control worrying: Nearly every day  Worrying too much about different things: More than half the days  Trouble Relaxing: Nearly every day  Being so restless that it is hard to sit still:  Several days  Becoming easily annoyed or irritable: Nearly every day  Feeling afraid as if something awful might happen: Several days  PING 7 Total Score: 16  If you checked any problems, how difficult have these problems made it for you to do your work, take care of things at home, or get along with other people: Very difficult           Assessment and Plan    Diagnoses and all orders for this visit:    1. Wellness examination (Primary)  Assessment & Plan:  Discussed preventative healthcare.  Labs ordered. Recommended annual eye and dental exams. Recommended use of seatbelts, sunscreen and smoke detectors in the home.  Recommended a Tdap vaccine and he declined.    Orders:  -     CBC & Differential  -     Comprehensive Metabolic Panel  -     Lipid Panel  -     TSH Rfx On Abnormal To Free T4    2. Depression, unspecified depression type  Assessment & Plan:  New problem  Begin bupropion  mg in AM  Will avoid SSRI due to sexual dysfunction side effects.    Advised if he begins to feel suicidal or if he has worsening symptoms of anxiety and/or depression, he should stop medicine and immediately call for follow-up appointment or seek emergency care.    Orders:  -     buPROPion XL (WELLBUTRIN XL) 150 MG 24 hr tablet; Take 1 tablet by mouth Every Morning.  Dispense: 30 tablet; Refill: 3    3. Hypogonadism, male  Overview:  Seeing endocrinology    Assessment & Plan:  Follow up with endocrinology as scheduled      4. Erectile dysfunction, unspecified erectile dysfunction type  Assessment & Plan:  Established problem.  Symptomatic  Restart tadalafil as needed    Orders:  -     tadalafil (CIALIS) 10 MG tablet; Take 1 tablet by mouth Daily As Needed for Erectile Dysfunction (take before sexual activity).  Dispense: 10 tablet; Refill: 0    5. JENNY (obstructive sleep apnea)  Overview:  Home Sleep Study 9/29/2023    Assessment & Plan:  He had a sleep study but did not start therapy with cpap due to expense.      6. Class 2  severe obesity due to excess calories with serious comorbidity and body mass index (BMI) of 35.0 to 35.9 in adult  Assessment & Plan:  Patient's (Body mass index is 35.38 kg/m².) indicates that they are obese (BMI >30) with health conditions that include none . Weight is unchanged. BMI  is above average; BMI management plan is completed. We discussed low calorie, low carb based diet program, portion control, and increasing exercise.       7. Tetanus, diphtheria, and acellular pertussis (Tdap) vaccination declined  Comments:  Discussed Tdap vaccine and he declined.    8. Influenza vaccination declined  Comments:  Discussed flu vaccine and he declined.         Follow Up   Return in about 6 weeks (around 12/6/2024) for follow up 4-6 weeks on depression.  Patient was given instructions and counseling regarding his condition or for health maintenance advice. Please see specific information pulled into the AVS if appropriate.    Patient Instructions   Go to [facility] ([place] [address] from office- [address]) for bloodwork.

## 2024-10-27 PROBLEM — F32.A DEPRESSION: Status: ACTIVE | Noted: 2024-10-27

## 2024-10-27 PROBLEM — N52.9 ERECTILE DYSFUNCTION: Status: ACTIVE | Noted: 2024-10-27

## 2024-10-27 NOTE — ASSESSMENT & PLAN NOTE
New problem  Begin bupropion  mg in AM  Will avoid SSRI due to sexual dysfunction side effects.    Advised if he begins to feel suicidal or if he has worsening symptoms of anxiety and/or depression, he should stop medicine and immediately call for follow-up appointment or seek emergency care.

## 2024-10-27 NOTE — ASSESSMENT & PLAN NOTE
Patient's (Body mass index is 35.38 kg/m².) indicates that they are obese (BMI >30) with health conditions that include none . Weight is unchanged. BMI  is above average; BMI management plan is completed. We discussed low calorie, low carb based diet program, portion control, and increasing exercise.

## 2024-10-27 NOTE — ASSESSMENT & PLAN NOTE
Discussed preventative healthcare.  Labs ordered. Recommended annual eye and dental exams. Recommended use of seatbelts, sunscreen and smoke detectors in the home.  Recommended a Tdap vaccine and he declined.

## 2024-11-19 ENCOUNTER — TELEPHONE (OUTPATIENT)
Dept: FAMILY MEDICINE CLINIC | Facility: CLINIC | Age: 24
End: 2024-11-19
Payer: COMMERCIAL

## 2024-11-19 NOTE — TELEPHONE ENCOUNTER
Called and left detailed message on pt's voicemail with information about labs with callback number for any questions.

## 2024-12-09 ENCOUNTER — TELEPHONE (OUTPATIENT)
Dept: FAMILY MEDICINE CLINIC | Facility: CLINIC | Age: 24
End: 2024-12-09
Payer: COMMERCIAL

## 2024-12-09 NOTE — TELEPHONE ENCOUNTER
Called and spoke to pt in regards to orders for labs.  Pt states he is waiting for insurance to kick in in January to get blood work done.

## 2025-02-23 DIAGNOSIS — F32.A DEPRESSION, UNSPECIFIED DEPRESSION TYPE: ICD-10-CM

## 2025-02-25 RX ORDER — BUPROPION HYDROCHLORIDE 150 MG/1
150 TABLET ORAL EVERY MORNING
Qty: 30 TABLET | Refills: 0 | Status: SHIPPED | OUTPATIENT
Start: 2025-02-25

## 2025-03-27 NOTE — TELEPHONE ENCOUNTER
[facility]      Hospitalist - History & Physical      PCP: Vanessa Prasad APRN - CNP    Date of Admission: 3/26/2025    Date of Service: 3/27/2025    Chief Complaint: Abdominal pain     History Of Present Illness:   The patient is a 58 y.o. female who presented as a transfer from [facility] for evaluation of choledocholithiasis by Dr. Kirit Cabrera.   Pt has had problems with right sided abdominal pain over past 4 days associated with nausea and worsening diarrhea when she eats food. She relates that she has had loose watery stools over past week having had outpatient evaluation with stool testing that she tells me was negative.     [facility]d/pelvis concerning for acute cholecystitis, multiple small gallstones in common bile duct. Pt denies prior abdominal surgeries. She has reported history of gerd, rheumatoid arthritis on Arava and chronic back pain. Pt is admitted inpatient to hospitalist.     Past Medical History:        Diagnosis Date    Arthritis     Cancer (HCC)     follicular lymphoma    Shoulder fracture, left        Past Surgical History:        Procedure Laterality Date    BACK SURGERY  12/2020    L3-S1    BACK SURGERY  01/27/2025    L1-L2 rods replaced    BONE MARROW BIOPSY Right 10/18/2019    BONE MARROW ASPIRATION BIOPSY performed by Marc Laughlin PA-C at [facility] OR    LYMPH NODE BIOPSY Right     WRIST SURGERY Bilateral        Home Medications:  Prior to Admission medications    Medication Sig Start Date End Date Taking? Authorizing Provider   alendronate (FOSAMAX) 70 MG tablet Take 1 tablet by mouth every 7 days 12/11/24  Yes ProviderHarvey MD   leflunomide (ARAVA) 20 MG tablet  10/9/24  Yes ProviderHarvey MD   pregabalin (LYRICA) 150 MG capsule Take 1 capsule by mouth 2 times daily. 12/11/24  Yes ProviderHarvey MD   Calcium-Magnesium-Vitamin D 500-250-200 MG-MG-UNIT TABS Take by mouth   Yes  Please call clinic and ask what they need from provider.

## 2025-04-04 ENCOUNTER — APPOINTMENT (OUTPATIENT)
Dept: GENERAL RADIOLOGY | Facility: HOSPITAL | Age: 25
End: 2025-04-04
Payer: COMMERCIAL

## 2025-04-04 ENCOUNTER — HOSPITAL ENCOUNTER (EMERGENCY)
Facility: HOSPITAL | Age: 25
Discharge: HOME | End: 2025-04-04
Attending: EMERGENCY MEDICINE
Payer: COMMERCIAL

## 2025-04-04 ENCOUNTER — APPOINTMENT (OUTPATIENT)
Dept: CT IMAGING | Facility: HOSPITAL | Age: 25
End: 2025-04-04
Payer: COMMERCIAL

## 2025-04-04 VITALS
TEMPERATURE: 98.7 F | OXYGEN SATURATION: 96 % | HEART RATE: 87 BPM | BODY MASS INDEX: 35.79 KG/M2 | RESPIRATION RATE: 15 BRPM | DIASTOLIC BLOOD PRESSURE: 76 MMHG | HEIGHT: 70 IN | SYSTOLIC BLOOD PRESSURE: 124 MMHG | WEIGHT: 250 LBS

## 2025-04-04 DIAGNOSIS — H53.9 VISUAL DISTURBANCE: Primary | ICD-10-CM

## 2025-04-04 LAB
ANION GAP SERPL CALCULATED.3IONS-SCNC: 12.7 MMOL/L (ref 5–15)
BASOPHILS # BLD AUTO: 0.03 10*3/MM3 (ref 0–0.2)
BASOPHILS NFR BLD AUTO: 0.4 % (ref 0–1.5)
BUN SERPL-MCNC: 13 MG/DL (ref 6–20)
BUN/CREAT SERPL: 12 (ref 7–25)
CALCIUM SPEC-SCNC: 9.1 MG/DL (ref 8.6–10.5)
CHLORIDE SERPL-SCNC: 107 MMOL/L (ref 98–107)
CO2 SERPL-SCNC: 23.3 MMOL/L (ref 22–29)
CREAT SERPL-MCNC: 1.08 MG/DL (ref 0.76–1.27)
DEPRECATED RDW RBC AUTO: 36.3 FL (ref 37–54)
EGFRCR SERPLBLD CKD-EPI 2021: 97.1 ML/MIN/1.73
EOSINOPHIL # BLD AUTO: 0.11 10*3/MM3 (ref 0–0.4)
EOSINOPHIL NFR BLD AUTO: 1.5 % (ref 0.3–6.2)
ERYTHROCYTE [DISTWIDTH] IN BLOOD BY AUTOMATED COUNT: 12 % (ref 12.3–15.4)
GLUCOSE SERPL-MCNC: 103 MG/DL (ref 65–99)
HCT VFR BLD AUTO: 47.1 % (ref 37.5–51)
HGB BLD-MCNC: 15.7 G/DL (ref 13–17.7)
IMM GRANULOCYTES # BLD AUTO: 0.06 10*3/MM3 (ref 0–0.05)
IMM GRANULOCYTES NFR BLD AUTO: 0.8 % (ref 0–0.5)
LYMPHOCYTES # BLD AUTO: 1.81 10*3/MM3 (ref 0.7–3.1)
LYMPHOCYTES NFR BLD AUTO: 24.2 % (ref 19.6–45.3)
MAGNESIUM SERPL-MCNC: 2.1 MG/DL (ref 1.6–2.6)
MCH RBC QN AUTO: 27.8 PG (ref 26.6–33)
MCHC RBC AUTO-ENTMCNC: 33.3 G/DL (ref 31.5–35.7)
MCV RBC AUTO: 83.5 FL (ref 79–97)
MONOCYTES # BLD AUTO: 0.51 10*3/MM3 (ref 0.1–0.9)
MONOCYTES NFR BLD AUTO: 6.8 % (ref 5–12)
NEUTROPHILS NFR BLD AUTO: 4.96 10*3/MM3 (ref 1.7–7)
NEUTROPHILS NFR BLD AUTO: 66.3 % (ref 42.7–76)
NRBC BLD AUTO-RTO: 0 /100 WBC (ref 0–0.2)
PLATELET # BLD AUTO: 299 10*3/MM3 (ref 140–450)
PMV BLD AUTO: 9.4 FL (ref 6–12)
POTASSIUM SERPL-SCNC: 3.9 MMOL/L (ref 3.5–5.2)
RBC # BLD AUTO: 5.64 10*6/MM3 (ref 4.14–5.8)
SODIUM SERPL-SCNC: 143 MMOL/L (ref 136–145)
WBC NRBC COR # BLD AUTO: 7.48 10*3/MM3 (ref 3.4–10.8)

## 2025-04-04 PROCEDURE — 70450 CT HEAD/BRAIN W/O DYE: CPT

## 2025-04-04 PROCEDURE — 80048 BASIC METABOLIC PNL TOTAL CA: CPT | Performed by: EMERGENCY MEDICINE

## 2025-04-04 PROCEDURE — 71045 X-RAY EXAM CHEST 1 VIEW: CPT

## 2025-04-04 PROCEDURE — 83735 ASSAY OF MAGNESIUM: CPT | Performed by: EMERGENCY MEDICINE

## 2025-04-04 PROCEDURE — 99284 EMERGENCY DEPT VISIT MOD MDM: CPT

## 2025-04-04 PROCEDURE — 93005 ELECTROCARDIOGRAM TRACING: CPT | Performed by: EMERGENCY MEDICINE

## 2025-04-04 PROCEDURE — 85025 COMPLETE CBC W/AUTO DIFF WBC: CPT | Performed by: EMERGENCY MEDICINE

## 2025-04-04 RX ORDER — SODIUM CHLORIDE 0.9 % (FLUSH) 0.9 %
10 SYRINGE (ML) INJECTION AS NEEDED
Status: DISCONTINUED | OUTPATIENT
Start: 2025-04-04 | End: 2025-04-04 | Stop reason: HOSPADM

## 2025-04-04 NOTE — ED PROVIDER NOTES
Subjective   History of Present Illness  Chief complaint: Double vision    25-year-old male presents after an episode of double vision.  Patient states he was climbing up into his semitruck when he started having double vision.  He states this lasted about 2 minutes and then resolved.  He described it as a binocular double vision.  When he would close 1 eye he would see normal, but both eyes open he was seeing double.  He states he felt somewhat lightheaded and near syncopal with this episode as well.  He denies any chest pain or shortness of breath.  He also reports last night around 3 AM he had an episode of right peripheral visual field loss and then had a migraine.  He states he has had similar symptoms with visual field loss and headache in the past.  He states he has never had double vision before.  He denies any focal numbness, weakness, tingling.  He states he feels fine now.    History provided by:  Patient      Review of Systems   Constitutional:  Negative for fever.   HENT:  Negative for congestion.    Eyes:  Positive for visual disturbance.   Respiratory:  Negative for cough and shortness of breath.    Cardiovascular:  Negative for chest pain.   Gastrointestinal:  Negative for abdominal pain, diarrhea and vomiting.   Musculoskeletal:  Negative for back pain.   Neurological:  Positive for light-headedness and headaches. Negative for weakness and numbness.   Psychiatric/Behavioral:  Negative for confusion.        Past Medical History:   Diagnosis Date    Depression     ED (erectile dysfunction)     Headache     Hypogonadism, male     JENNY (obstructive sleep apnea)     Visual impairment        No Known Allergies    Past Surgical History:   Procedure Laterality Date    NO PAST SURGERIES         Family History   Problem Relation Age of Onset    Depression Mother     Bipolar disorder Mother     Anxiety disorder Mother     Hypothyroidism Mother     Heart attack Mother     Nephrolithiasis Father     Drug abuse  "Father         Former    Scoliosis Sister     ADD / ADHD Sister     Anxiety disorder Sister     Depression Sister     Depression Sister     Neuropathy Maternal Grandmother     Hypothyroidism Maternal Grandmother     Dementia Maternal Grandmother     Heart attack Maternal Grandfather     Sleep apnea Neg Hx     Sleep disorder Neg Hx     Narcolepsy Neg Hx        Social History     Socioeconomic History    Marital status:      Spouse name: Margarita Spear    Number of children: 0    Highest education level: High school graduate   Tobacco Use    Smoking status: Never     Passive exposure: Never    Smokeless tobacco: Never   Vaping Use    Vaping status: Former   Substance and Sexual Activity    Alcohol use: Yes     Comment: Social    Drug use: Never    Sexual activity: Yes     Partners: Female     Birth control/protection: None     Comment:        /76   Pulse 87   Temp 98.7 °F (37.1 °C) (Oral)   Resp 15   Ht 177.8 cm (70\")   Wt 113 kg (250 lb)   SpO2 96%   BMI 35.87 kg/m²       Objective   Physical Exam  Vitals and nursing note reviewed.   Constitutional:       Appearance: Normal appearance.   HENT:      Head: Normocephalic and atraumatic.      Mouth/Throat:      Mouth: Mucous membranes are moist.   Eyes:      Extraocular Movements: Extraocular movements intact.      Pupils: Pupils are equal, round, and reactive to light.   Cardiovascular:      Rate and Rhythm: Normal rate and regular rhythm.      Heart sounds: Normal heart sounds.   Pulmonary:      Effort: Pulmonary effort is normal. No respiratory distress.      Breath sounds: Normal breath sounds.   Skin:     General: Skin is warm and dry.   Neurological:      General: No focal deficit present.      Mental Status: He is alert and oriented to person, place, and time.         Procedures           ED Course      My interpretation of EKG shows sinus rhythm, rate of 83, no ST elevation                                     Results for orders placed or " performed during the hospital encounter of 04/04/25   Basic Metabolic Panel    Collection Time: 04/04/25  6:29 PM    Specimen: Blood   Result Value Ref Range    Glucose 103 (H) 65 - 99 mg/dL    BUN 13 6 - 20 mg/dL    Creatinine 1.08 0.76 - 1.27 mg/dL    Sodium 143 136 - 145 mmol/L    Potassium 3.9 3.5 - 5.2 mmol/L    Chloride 107 98 - 107 mmol/L    CO2 23.3 22.0 - 29.0 mmol/L    Calcium 9.1 8.6 - 10.5 mg/dL    BUN/Creatinine Ratio 12.0 7.0 - 25.0    Anion Gap 12.7 5.0 - 15.0 mmol/L    eGFR 97.1 >60.0 mL/min/1.73   Magnesium    Collection Time: 04/04/25  6:29 PM    Specimen: Blood   Result Value Ref Range    Magnesium 2.1 1.6 - 2.6 mg/dL   CBC Auto Differential    Collection Time: 04/04/25  6:29 PM    Specimen: Blood   Result Value Ref Range    WBC 7.48 3.40 - 10.80 10*3/mm3    RBC 5.64 4.14 - 5.80 10*6/mm3    Hemoglobin 15.7 13.0 - 17.7 g/dL    Hematocrit 47.1 37.5 - 51.0 %    MCV 83.5 79.0 - 97.0 fL    MCH 27.8 26.6 - 33.0 pg    MCHC 33.3 31.5 - 35.7 g/dL    RDW 12.0 (L) 12.3 - 15.4 %    RDW-SD 36.3 (L) 37.0 - 54.0 fl    MPV 9.4 6.0 - 12.0 fL    Platelets 299 140 - 450 10*3/mm3    Neutrophil % 66.3 42.7 - 76.0 %    Lymphocyte % 24.2 19.6 - 45.3 %    Monocyte % 6.8 5.0 - 12.0 %    Eosinophil % 1.5 0.3 - 6.2 %    Basophil % 0.4 0.0 - 1.5 %    Immature Grans % 0.8 (H) 0.0 - 0.5 %    Neutrophils, Absolute 4.96 1.70 - 7.00 10*3/mm3    Lymphocytes, Absolute 1.81 0.70 - 3.10 10*3/mm3    Monocytes, Absolute 0.51 0.10 - 0.90 10*3/mm3    Eosinophils, Absolute 0.11 0.00 - 0.40 10*3/mm3    Basophils, Absolute 0.03 0.00 - 0.20 10*3/mm3    Immature Grans, Absolute 0.06 (H) 0.00 - 0.05 10*3/mm3    nRBC 0.0 0.0 - 0.2 /100 WBC   ECG 12 Lead Other; dizziness    Collection Time: 04/04/25  6:34 PM   Result Value Ref Range    QT Interval 347 ms    QTC Interval 408 ms     XR Chest 1 View  Result Date: 4/4/2025  No radiographic findings of acute cardiopulmonary abnormality. Electronically Signed: Daren Powers  4/4/2025 7:36 PM EDT   Workstation ID: [id number]    CT Head Without Contrast  Result Date: 4/4/2025  Impression: No acute intracranial pathology. Electronically Signed: Kalin Bray MD  4/4/2025 7:18 PM EDT  Workstation ID: [id number]                  Medical Decision Making  Amount and/or Complexity of Data Reviewed  Labs: ordered.  Radiology: ordered.  ECG/medicine tests: ordered.    Risk  Prescription drug management.      Patient had the above evaluation.  Results were discussed with the patient.  CT head shows no acute intracranial abnormality.  My interpretation of chest x-ray shows no infiltrate or effusion.  EKG shows no acute ischemia.  White blood cell count is normal.  BMP is unremarkable.  Patient remains well-appearing in the emergency room.  He has a normal neurologic exam.  Symptoms likely related to migraine.  He will be discharged and will follow-up with his primary provider.      Final diagnoses:   Visual disturbance       ED Disposition  ED Disposition       ED Disposition   Discharge    Condition   Stable    Comment   --               Angélica Abraham, APRN  [address] [place]  [place] [address]  [phone]    Call in 2 days           Medication List      No changes were made to your prescriptions during this visit.            Hussein Field MD  04/04/25 1950

## 2025-04-06 LAB
QT INTERVAL: 347 MS
QTC INTERVAL: 408 MS

## 2025-07-30 ENCOUNTER — OFFICE VISIT (OUTPATIENT)
Dept: FAMILY MEDICINE CLINIC | Facility: CLINIC | Age: 25
End: 2025-07-30
Payer: COMMERCIAL

## 2025-07-30 VITALS
BODY MASS INDEX: 35.1 KG/M2 | HEART RATE: 94 BPM | RESPIRATION RATE: 18 BRPM | HEIGHT: 70 IN | DIASTOLIC BLOOD PRESSURE: 84 MMHG | WEIGHT: 245.2 LBS | TEMPERATURE: 97.4 F | SYSTOLIC BLOOD PRESSURE: 134 MMHG | OXYGEN SATURATION: 97 %

## 2025-07-30 DIAGNOSIS — E66.01 CLASS 2 SEVERE OBESITY DUE TO EXCESS CALORIES WITH SERIOUS COMORBIDITY AND BODY MASS INDEX (BMI) OF 35.0 TO 35.9 IN ADULT: ICD-10-CM

## 2025-07-30 DIAGNOSIS — E66.812 CLASS 2 SEVERE OBESITY DUE TO EXCESS CALORIES WITH SERIOUS COMORBIDITY AND BODY MASS INDEX (BMI) OF 35.0 TO 35.9 IN ADULT: ICD-10-CM

## 2025-07-30 DIAGNOSIS — Z00.00 WELLNESS EXAMINATION: Primary | ICD-10-CM

## 2025-07-30 DIAGNOSIS — E29.1 HYPOGONADISM, MALE: ICD-10-CM

## 2025-07-30 DIAGNOSIS — F33.1 MODERATE EPISODE OF RECURRENT MAJOR DEPRESSIVE DISORDER: ICD-10-CM

## 2025-07-30 DIAGNOSIS — Z28.21 TETANUS, DIPHTHERIA, AND ACELLULAR PERTUSSIS (TDAP) VACCINATION DECLINED: ICD-10-CM

## 2025-07-30 DIAGNOSIS — F41.9 ANXIETY: ICD-10-CM

## 2025-07-30 RX ORDER — BUPROPION HYDROCHLORIDE 150 MG/1
150 TABLET ORAL EVERY MORNING
Qty: 90 TABLET | Refills: 1 | Status: SHIPPED | OUTPATIENT
Start: 2025-07-30

## 2025-07-30 NOTE — PROGRESS NOTES
Chief Complaint  Annual Exam and Depression    Subjective          Neel is a 25 y.o. male  who presents to [facility]     Patient Care Team:  Angélica Abraham APRN as PCP - General (Family Medicine)  Rosita Ochoa MD as Consulting Physician (Endocrinology)     History of Present Illness  Adult Annual Wellness    Social History    Tobacco Use      Smoking status: Never        Passive exposure: Never      Smokeless tobacco: Never    Vaping Use      Vaping status: Former    Alcohol use: Yes      Comment: Social    Drug use: Never     Caffeine use - soda sometimes    General health habits   Last eye exam - > 3 years ago  Last dental exam - > 3 years ago    Diet - Regular diet    Weight / BMI - obese, BMI 35.2    Exercise - no    Hours of sleep per night ? 8    Safety -   Do you use seat belts consistently ? Yes   Working smoke detector in home ? Yes   Do you use sunscreen when outdoors ? Yes     Reproductive health -  Sexually active ? Yes   Erectile dysfunction ? Yes     Screening/prevention  Colon cancer screening - not applicable due to age  Prostate cancer screening (PSA) - not applicable due to age  CT low dose lung cancer screening - not applicable due to age      Immunizations -   Tdap - 7/25/2011, declines today  Pneumococcal vaccine - no  Shingles (Shingrix) - no    ++++++++++++++++++++++++    Patient presents for follow-up of depression  This is an established problem.  Not controlled.  Interim treatment changes: started bupropion  mg in AM on 10/25/2024.  He stopped taking the medication.  He states it did help and would like to restart it.  Current medications: none    Trying to conceive a child  Semen count low at a fertility clinic  He was referred to another provider (for sperm motility)        PHQ-9 = 15 on 7/30/2025  Was 14 on 10/25/2024    PING-7 = 14 on 7/30/2025  Was 16 on 10/25/2024     ++++++++++++++++++++++++     Patient presents for follow-up of  hypogonadism (Secondary male hypogonadism) and ED  This is an established problem  Interim treatment changes: He was taking clomid and cialis.    He stopped taking both medications.  Current medications: none  He saw endocrinology, Dr. Ochoa, on 1/10/2025.  Labs were ordered but he has not had labs done yet.    ++++++++++++++++++++++++    Had an episode of vision changes  He could see normally with one eye (either eye) but had blurry vision with both eyes  Lasted about 5 minutes  Went to the ER for an evaluation  Discharged home and thought changes were due to migraine.  He has not had this happen again  No recent eye exam by eye doctor    Earlier hospital emergency visit  Symptoms include: fatigue.   Pertinent negative symptoms include no abdominal pain, no anorexia, no joint pain, no change in stool, no chest pain, no chills, no congestion, no cough, no diaphoresis, no fever, no headaches, no joint swelling, no myalgias, no nausea, no neck pain, no numbness, no rash, no sore throat, no swollen glands, no dysuria, no vertigo, no visual change, no vomiting and no weakness.   Additional information: No current symptoms  Depression    Symptoms: nervousness/anxious      Symptoms: no chest pain, no nausea, no palpitations, no shortness of breath and no suicidal ideas      Nighttime awakenings:     PMH Includes: depression        Neel Spear  has a past medical history of Anxiety, Depression, ED (erectile dysfunction), Headache, Hypogonadism, male, JENNY (obstructive sleep apnea), and Visual impairment.      Review of Systems   Constitutional:  Positive for fatigue. Negative for chills, diaphoresis, fever and unexpected weight change.   HENT:  Negative for congestion, ear pain, sore throat and trouble swallowing.    Eyes:  Negative for visual disturbance.   Respiratory:  Negative for cough and shortness of breath.    Cardiovascular:  Negative for chest pain, palpitations and leg swelling.   Gastrointestinal:  Negative for  abdominal pain, anorexia, constipation, diarrhea, nausea and vomiting.   Genitourinary:  Negative for difficulty urinating, dysuria, frequency, hematuria, penile swelling, scrotal swelling, testicular pain and urgency.   Musculoskeletal:  Negative for joint pain, myalgias and neck pain.   Skin:  Negative for rash.   Neurological:  Negative for vertigo, weakness, numbness and headaches.   Psychiatric/Behavioral:  Positive for dysphoric mood. Negative for sleep disturbance and suicidal ideas. The patient is nervous/anxious.         Family History   Problem Relation Age of Onset    Depression Mother     Bipolar disorder Mother     Anxiety disorder Mother     Hypothyroidism Mother     Heart attack Mother     Nephrolithiasis Father     Drug abuse Father         Former    Scoliosis Sister     ADD / ADHD Sister     Anxiety disorder Sister     Depression Sister     Depression Sister     Neuropathy Maternal Grandmother     Hypothyroidism Maternal Grandmother     Dementia Maternal Grandmother     Heart attack Maternal Grandfather     Sleep apnea Neg Hx     Sleep disorder Neg Hx     Narcolepsy Neg Hx         Past Surgical History:   Procedure Laterality Date    NO PAST SURGERIES          Social History     Socioeconomic History    Marital status:      Spouse name: Margarita Spear    Number of children: 0    Highest education level: High school graduate   Tobacco Use    Smoking status: Never     Passive exposure: Never    Smokeless tobacco: Never   Vaping Use    Vaping status: Former   Substance and Sexual Activity    Alcohol use: Yes     Comment: Social    Drug use: Never    Sexual activity: Yes     Partners: Female     Birth control/protection: None     Comment:         Immunization History   Administered Date(s) Administered    DTaP 1999, 01/04/2000, 04/03/2000, 01/14/2002, 07/06/2004    Hepatitis B Adult/Adolescent IM 1999, 1999, 04/03/2000, 01/14/2002    MMR 04/03/2000, 07/06/2004     "Meningococcal B,(Bexsero) 01/04/2018    Meningococcal Conjugate 07/25/2011, 01/04/2018    OPV 1999, 01/04/2000, 01/14/2002, 07/06/2004    Tdap 07/25/2011    Varicella 01/14/2002, 07/25/2011       Objective       Current Outpatient Medications:     buPROPion XL (WELLBUTRIN XL) 150 MG 24 hr tablet, Take 1 tablet by mouth Every Morning., Disp: 90 tablet, Rfl: 1    Vital Signs:      /84 (BP Location: Left arm, Patient Position: Sitting, Cuff Size: Adult)   Pulse 94   Temp 97.4 °F (36.3 °C) (Temporal)   Resp 18   Ht 177.8 cm (70\")   Wt 111 kg (245 lb 3.2 oz)   SpO2 97%   BMI 35.18 kg/m²     Vitals:    07/30/25 1320   BP: 134/84   BP Location: Left arm   Patient Position: Sitting   Cuff Size: Adult   Pulse: 94   Resp: 18   Temp: 97.4 °F (36.3 °C)   TempSrc: Temporal   SpO2: 97%   Weight: 111 kg (245 lb 3.2 oz)   Height: 177.8 cm (70\")      Physical Exam  Vitals reviewed.   Constitutional:       General: He is not in acute distress.     Appearance: Normal appearance. He is obese.   HENT:      Head: Normocephalic and atraumatic.      Right Ear: Tympanic membrane, ear canal and external ear normal.      Left Ear: Tympanic membrane, ear canal and external ear normal.      Mouth/Throat:      Mouth: Mucous membranes are moist.      Pharynx: Oropharynx is clear. No oropharyngeal exudate.   Eyes:      General: No scleral icterus.     Conjunctiva/sclera: Conjunctivae normal.   Neck:      Thyroid: No thyromegaly.   Cardiovascular:      Rate and Rhythm: Normal rate and regular rhythm.      Heart sounds: Normal heart sounds.   Pulmonary:      Effort: Pulmonary effort is normal. No respiratory distress.      Breath sounds: Normal breath sounds.   Abdominal:      General: Bowel sounds are normal.      Palpations: Abdomen is soft. There is no mass.      Tenderness: There is no abdominal tenderness. There is no guarding or rebound.   Musculoskeletal:      Cervical back: Neck supple.      Right lower leg: No edema.      " Left lower leg: No edema.   Lymphadenopathy:      Cervical: No cervical adenopathy.   Skin:     General: Skin is warm and dry.   Neurological:      Mental Status: He is alert and oriented to person, place, and time.   Psychiatric:         Mood and Affect: Mood normal.         Behavior: Behavior normal.        Result Review :                Little interest or pleasure in doing things? More than half the days   Feeling down, depressed, or hopeless? More than half the days   PHQ-2 Total Score 4   Trouble falling or staying asleep, or sleeping too much? More than half the days   Feeling tired or having little energy? More than half the days   Poor appetite or overeating? More than half the days   Feeling bad about yourself - or that you are a failure or have let yourself or your family down? More than half the days   Trouble concentrating on things, such as reading the newspaper or watching television? More than half the days   Moving or speaking so slowly that other people could have noticed? Or the opposite - being so fidgety or restless that you have been moving around a lot more than usual? Several days     Thoughts that you would be better off dead, or of hurting yourself in some way? Not at all   PHQ-9 Total Score 15   If you checked off any problems, how difficult have these problems made it for you to do your work, take care of things at home, or get along with other people? Somewhat difficult         Over the last two weeks, how often have you been bothered by the following problems?  Feeling nervous, anxious or on edge: More than half the days  Not being able to stop or control worrying: Nearly every day  Worrying too much about different things: More than half the days  Trouble Relaxing: More than half the days  Being so restless that it is hard to sit still: Several days  Becoming easily annoyed or irritable: More than half the days  Feeling afraid as if something awful might happen: More than half the  days  PING 7 Total Score: 14  If you checked any problems, how difficult have these problems made it for you to do your work, take care of things at home, or get along with other people: Somewhat difficult           Assessment and Plan    Diagnoses and all orders for this visit:    1. Wellness examination (Primary)  Assessment & Plan:  Discussed preventative healthcare.  Labs ordered. Recommended annual eye and dental exams. Recommended use of seatbelts, sunscreen and smoke detectors in the home.  Discussed and recommended Tdap vaccine but he declined. Recommended he make an appointment with optometrist for eye exam.    Orders:  -     CBC & Differential  -     Comprehensive Metabolic Panel  -     Lipid Panel  -     TSH Rfx On Abnormal To Free T4    2. Moderate episode of recurrent major depressive disorder  Assessment & Plan:  Established problem.  Symptomatic.   Restart bupropion  mg in AM  Will avoid SSRI due to sexual dysfunction side effects.    Advised if he begins to feel suicidal or if he has worsening symptoms of anxiety and/or depression, he should stop medicine and immediately call for follow-up appointment or seek emergency care.    Orders:  -     buPROPion XL (WELLBUTRIN XL) 150 MG 24 hr tablet; Take 1 tablet by mouth Every Morning.  Dispense: 90 tablet; Refill: 1    3. Anxiety    4. Hypogonadism, male  Overview:  Seeing endocrinology for Secondary male hypogonadism    Assessment & Plan:  Follow up with endocrinology as scheduled      5. Tetanus, diphtheria, and acellular pertussis (Tdap) vaccination declined    6. Class 2 severe obesity due to excess calories with serious comorbidity and body mass index (BMI) of 35.0 to 35.9 in adult  Assessment & Plan:  Patient's (Body mass index is 35.18 kg/m².) indicates that they are obese (BMI >30) with health conditions that include none . Weight is improving with lifestyle modifications. BMI  is above average; BMI management plan is completed. We discussed  low calorie, low carb based diet program, portion control, and increasing exercise.                Follow Up   Return in about 6 weeks (around 9/10/2025) for depression.  Patient was given instructions and counseling regarding his condition or for health maintenance advice. Please see specific information pulled into the AVS if appropriate.    There are no Patient Instructions on file for this visit.

## 2025-08-01 NOTE — ASSESSMENT & PLAN NOTE
Discussed preventative healthcare.  Labs ordered. Recommended annual eye and dental exams. Recommended use of seatbelts, sunscreen and smoke detectors in the home.  Discussed and recommended Tdap vaccine but he declined. Recommended he make an appointment with optometrist for eye exam.

## 2025-08-01 NOTE — ASSESSMENT & PLAN NOTE
Established problem.  Symptomatic.   Restart bupropion  mg in AM  Will avoid SSRI due to sexual dysfunction side effects.    Advised if he begins to feel suicidal or if he has worsening symptoms of anxiety and/or depression, he should stop medicine and immediately call for follow-up appointment or seek emergency care.

## 2025-08-01 NOTE — ASSESSMENT & PLAN NOTE
Patient's (Body mass index is 35.18 kg/m².) indicates that they are obese (BMI >30) with health conditions that include none . Weight is improving with lifestyle modifications. BMI  is above average; BMI management plan is completed. We discussed low calorie, low carb based diet program, portion control, and increasing exercise.